# Patient Record
(demographics unavailable — no encounter records)

---

## 2024-11-13 NOTE — HISTORY OF PRESENT ILLNESS
[FreeTextEntry1] : 11/13/2024: GLORIA RODRIGUEZ is a 42 year old female presenting to the office for a follow up evaluation s/p right ankle ORIF trimalleolar fracture, DOS: 7/23/2024. Overall, she is very satisfied with her surgery. She denies pain although she states that she is limping. She does endorse some irritation and discomfort in the medial aspect of her ankle where her hardware is. The patient presents to the office in sneakers and ambulating without assistance.

## 2024-11-13 NOTE — PHYSICAL EXAM
[de-identified] : Right ankle Physical Examination:   General: Alert and oriented x3.  In no acute distress.  Pleasant in nature with a normal affect.  No apparent respiratory distress. Erythema, Warmth, Rubor: Negative Swelling: Negative  No signs of infection present. The surgical incision site(s) was  clean, dry and intact, healed, not erythematous, absent of discharge and not dehisced.   ROM: 1. Dorsiflexion: 10 degrees 2. Plantarflexion: 40 degrees 3. 10 degrees of subtalar motion 4. Inversion: 20 degrees 5. Eversion: 20 degrees   Tenderness to Palpation: 1. Lateral Malleolus: Negative 2. Medial Malleolus: Negative 3. Proximal Fibular Pain: Negative 4. Heel Pain: Negative 5. Cuboid: Negative 6. Navicular: Negative 7. Tibiotalar Joint: Negative 8. Subtalar Joint: Negative 9. Posterior Recess: Negative   Tendon Pain: 1. Achilles: Negative 2. Peroneals: Negative 3. Posterior Tibialis: Negative 4. Tibialis Anterior: Negative   Ligament Pain: 1. ATFL: Negative 2. CFL: Negative 3. PTFL: Negative 4. Deltoid Ligaments: Negative 5. Lis Franc Ligament: Negative   Stability: 1. Anterior Drawer: Negative 2. Posterior Drawer: Negative   Strength: 5/5 TA/GS/EHL   Pulses: 2+ DP/PT Pulses   Neuro: Intact motor and sensory   Additional Test: 1. Calcaneal Squeeze Test: Negative 2. Syndesmosis Squeeze Test: Negative 3. Treviño Test: Negative  [de-identified] : 3V of the right ankle were ordered, obtained and reviewed by me today, 11/13/2024, and revealed: hardware intact. No evidence of loosening or wear. Trimalleolar fracture has healed.

## 2024-11-13 NOTE — ADDENDUM
[FreeTextEntry1] : I, Francois Juan, acted solely as a scribe for Dr. Leroy Madden on this date 11/13/2024.   All medical record entries made by the Scribe were at my, Dr. Leroy Madden, direction and personally dictated by me on 11/13/2024. I have reviewed the chart and agree that the record accurately reflects my personal performance of the history, physical exam, assessment and plan. I have also personally directed, reviewed, and agreed with the chart.

## 2024-11-13 NOTE — DISCUSSION/SUMMARY
[de-identified] : Today I had a lengthy discussion with the patient regarding their right ankle, trimalleolar ORIF. I have addressed all the patient's concerns surrounding the pathology of their condition. I have reviewed the patient's XR imaging with them in great detail. Hardware intact. No evidence of loosening or wear. Trimalleolar fracture has healed. Overall, the patient is doing well, and I am very pleased with the patient's progress. I did advise the patient that she may want hardware removal in the future. She can gradually resume normal baseline activities as tolerated.    Plan:  1. The patient can finish her prescription for physical therapy. I want her to focus on strengthening as she is limping. Eventually, I want her to transition into a home exercise/stretching program.  2. I recommend that the patient utilize ice, NSAIDS/Tylenol PRN, and heat.  3. A discussion was had about shoe-wear modifications. I advised the patient to utilize a wide toed cross training sneaker that better accommodates the feet. I recommended [employer], [employer], [employer], or [employer] to the patient.  f/u in 3-4 months prn.   The patient understood and verbally agreed to the treatment plan. All of their questions were answered, and they were satisfied with the visit. The patient should call the office if they have any questions or experience worsening symptoms.

## 2024-11-13 NOTE — REASON FOR VISIT
[Follow-Up Visit] : a follow-up visit for [FreeTextEntry2] : s/p right ankle ORIF trimalleolar fracture, DOS: 7/23/2024.